# Patient Record
Sex: FEMALE | Race: WHITE | HISPANIC OR LATINO | URBAN - METROPOLITAN AREA
[De-identification: names, ages, dates, MRNs, and addresses within clinical notes are randomized per-mention and may not be internally consistent; named-entity substitution may affect disease eponyms.]

---

## 2020-03-06 ENCOUNTER — OFFICE VISIT (OUTPATIENT)
Dept: INTERNAL MEDICINE | Age: 20
End: 2020-03-06

## 2020-03-06 VITALS
HEART RATE: 80 BPM | DIASTOLIC BLOOD PRESSURE: 70 MMHG | HEIGHT: 62 IN | BODY MASS INDEX: 36.44 KG/M2 | WEIGHT: 198 LBS | OXYGEN SATURATION: 98 % | RESPIRATION RATE: 12 BRPM | SYSTOLIC BLOOD PRESSURE: 120 MMHG | TEMPERATURE: 99.4 F

## 2020-03-06 DIAGNOSIS — E55.9 VITAMIN D DEFICIENCY: ICD-10-CM

## 2020-03-06 DIAGNOSIS — Z23 NEED FOR HPV VACCINATION: ICD-10-CM

## 2020-03-06 DIAGNOSIS — Z00.00 ROUTINE GENERAL MEDICAL EXAMINATION AT HEALTH CARE FACILITY: Primary | ICD-10-CM

## 2020-03-06 PROCEDURE — 90471 IMMUNIZATION ADMIN: CPT

## 2020-03-06 PROCEDURE — 99385 PREV VISIT NEW AGE 18-39: CPT | Performed by: INTERNAL MEDICINE

## 2020-03-06 PROCEDURE — 90651 9VHPV VACCINE 2/3 DOSE IM: CPT

## 2020-03-06 SDOH — HEALTH STABILITY: MENTAL HEALTH
STRESS IS WHEN SOMEONE FEELS TENSE, NERVOUS, ANXIOUS, OR CAN'T SLEEP AT NIGHT BECAUSE THEIR MIND IS TROUBLED. HOW STRESSED ARE YOU?: TO SOME EXTENT

## 2020-03-06 SDOH — HEALTH STABILITY: MENTAL HEALTH: HOW OFTEN DO YOU HAVE A DRINK CONTAINING ALCOHOL?: MONTHLY OR LESS

## 2020-03-06 SDOH — HEALTH STABILITY: PHYSICAL HEALTH: ON AVERAGE, HOW MANY DAYS PER WEEK DO YOU ENGAGE IN MODERATE TO STRENUOUS EXERCISE (LIKE A BRISK WALK)?: 0 DAYS

## 2020-03-06 SDOH — HEALTH STABILITY: MENTAL HEALTH: HOW MANY STANDARD DRINKS CONTAINING ALCOHOL DO YOU HAVE ON A TYPICAL DAY?: 1 OR 2

## 2020-03-06 ASSESSMENT — PATIENT HEALTH QUESTIONNAIRE - PHQ9
SUM OF ALL RESPONSES TO PHQ9 QUESTIONS 1 AND 2: 0
2. FEELING DOWN, DEPRESSED OR HOPELESS: NOT AT ALL
SUM OF ALL RESPONSES TO PHQ9 QUESTIONS 1 AND 2: 0
1. LITTLE INTEREST OR PLEASURE IN DOING THINGS: NOT AT ALL

## 2020-08-11 ENCOUNTER — TELEPHONE (OUTPATIENT)
Dept: INTERNAL MEDICINE | Age: 20
End: 2020-08-11

## 2020-08-11 DIAGNOSIS — Z00.00 ROUTINE GENERAL MEDICAL EXAMINATION AT A HEALTH CARE FACILITY: Primary | ICD-10-CM

## 2020-08-11 DIAGNOSIS — E55.9 VITAMIN D DEFICIENCY: ICD-10-CM

## 2020-08-19 ENCOUNTER — OFFICE VISIT (OUTPATIENT)
Dept: INTERNAL MEDICINE | Age: 20
End: 2020-08-19

## 2020-08-19 ENCOUNTER — TELEPHONE (OUTPATIENT)
Dept: INTERNAL MEDICINE | Age: 20
End: 2020-08-19

## 2020-08-19 VITALS
RESPIRATION RATE: 14 BRPM | DIASTOLIC BLOOD PRESSURE: 62 MMHG | OXYGEN SATURATION: 99 % | HEIGHT: 62 IN | SYSTOLIC BLOOD PRESSURE: 112 MMHG | BODY MASS INDEX: 35.7 KG/M2 | HEART RATE: 75 BPM | TEMPERATURE: 98 F | WEIGHT: 194 LBS

## 2020-08-19 DIAGNOSIS — R21 RASH: Primary | ICD-10-CM

## 2020-08-19 PROCEDURE — 99214 OFFICE O/P EST MOD 30 MIN: CPT | Performed by: INTERNAL MEDICINE

## 2020-08-19 RX ORDER — TRIAMCINOLONE ACETONIDE 1 MG/G
CREAM TOPICAL 2 TIMES DAILY
Qty: 15 G | Refills: 0 | Status: SHIPPED | OUTPATIENT
Start: 2020-08-19

## 2020-08-19 SDOH — HEALTH STABILITY: MENTAL HEALTH: HOW OFTEN DO YOU HAVE A DRINK CONTAINING ALCOHOL?: MONTHLY OR LESS

## 2020-08-19 SDOH — HEALTH STABILITY: MENTAL HEALTH: HOW MANY STANDARD DRINKS CONTAINING ALCOHOL DO YOU HAVE ON A TYPICAL DAY?: 1 OR 2

## 2020-08-19 ASSESSMENT — PATIENT HEALTH QUESTIONNAIRE - PHQ9
CLINICAL INTERPRETATION OF PHQ9 SCORE: NO FURTHER SCREENING NEEDED
CLINICAL INTERPRETATION OF PHQ2 SCORE: NO FURTHER SCREENING NEEDED
SUM OF ALL RESPONSES TO PHQ9 QUESTIONS 1 AND 2: 1
1. LITTLE INTEREST OR PLEASURE IN DOING THINGS: NOT AT ALL
SUM OF ALL RESPONSES TO PHQ9 QUESTIONS 1 AND 2: 1
2. FEELING DOWN, DEPRESSED OR HOPELESS: SEVERAL DAYS

## 2020-09-01 ENCOUNTER — LAB SERVICES (OUTPATIENT)
Dept: LAB | Age: 20
End: 2020-09-01

## 2020-09-01 DIAGNOSIS — E55.9 VITAMIN D DEFICIENCY: ICD-10-CM

## 2020-09-01 DIAGNOSIS — Z00.00 ROUTINE GENERAL MEDICAL EXAMINATION AT A HEALTH CARE FACILITY: ICD-10-CM

## 2020-09-01 LAB
25(OH)D3 SERPL-MCNC: 27 NG/ML (ref 30–100)
ALBUMIN SERPL-MCNC: 4.2 G/DL (ref 3.6–5.1)
ALP SERPL-CCNC: 96 U/L (ref 45–130)
ALT SERPL W/O P-5'-P-CCNC: 21 U/L (ref 4–38)
AST SERPL-CCNC: 31 U/L (ref 14–43)
BASOPHIL %: 0.7 % (ref 0–1.2)
BASOPHIL ABSOLUTE #: 0.1 10*3/UL (ref 0–0.1)
BILIRUB SERPL-MCNC: 0.6 MG/DL (ref 0–1.3)
BUN SERPL-MCNC: 10 MG/DL (ref 7–20)
CALCIUM SERPL-MCNC: 9.3 MG/DL (ref 8.6–10.6)
CHLORIDE SERPL-SCNC: 103 MMOL/L (ref 96–107)
CHOLEST SERPL-MCNC: 181 MG/DL (ref 140–200)
CO2 SERPL-SCNC: 25 MMOL/L (ref 22–32)
CREAT SERPL-MCNC: 0.6 MG/DL (ref 0.5–1.4)
DIFFERENTIAL TYPE: ABNORMAL
EOSINOPHIL %: 1.3 % (ref 0–10)
EOSINOPHIL ABSOLUTE #: 0.1 10*3/UL (ref 0–0.5)
GFR SERPL CREATININE-BSD FRML MDRD: >60 ML/MIN/{1.73M2}
GFR SERPL CREATININE-BSD FRML MDRD: >60 ML/MIN/{1.73M2}
GLUCOSE P FAST SERPL-MCNC: 91 MG/DL (ref 60–100)
HDLC SERPL-MCNC: 51 MG/DL
HEMATOCRIT: 45.8 % (ref 34–45)
HEMOGLOBIN: 14.7 G/DL (ref 11.2–15.7)
IMMATURE GRANULOCYTE ABSOLUTE: 0.03 10*3/UL (ref 0–0.05)
IMMATURE GRANULOCYTE PERCENT: 0.4 % (ref 0–0.5)
LDLC SERPL CALC-MCNC: 107 MG/DL (ref 30–100)
LYMPH PERCENT: 35.4 % (ref 20.5–51.1)
LYMPHOCYTE ABSOLUTE #: 2.7 10*3/UL (ref 1.2–3.4)
MEAN CORPUSCULAR HGB CONCENTRATION: 32.1 % (ref 32–36)
MEAN CORPUSCULAR HGB: 28.2 PG (ref 27–34)
MEAN CORPUSCULAR VOLUME: 87.9 FL (ref 79–95)
MEAN PLATELET VOLUME: 10.9 FL (ref 8.6–12.4)
MONOCYTE ABSOLUTE #: 0.6 10*3/UL (ref 0.2–0.9)
MONOCYTE PERCENT: 7.4 % (ref 4.3–12.9)
NEUTROPHIL ABSOLUTE #: 4.2 10*3/UL (ref 1.4–6.5)
NEUTROPHIL PERCENT: 54.8 % (ref 34–73.5)
PLATELET COUNT: 441 10*3/UL (ref 150–400)
POTASSIUM SERPL-SCNC: 4.4 MMOL/L (ref 3.5–5.3)
PROT SERPL-MCNC: 6.9 G/DL (ref 6.4–8.5)
RED BLOOD CELL COUNT: 5.21 10*6/UL (ref 3.7–5.2)
RED CELL DISTRIBUTION WIDTH: 13.4 % (ref 11.3–14.8)
SODIUM SERPL-SCNC: 136 MMOL/L (ref 136–146)
TRIGL SERPL-MCNC: 113 MG/DL (ref 0–200)
TSH SERPL DL<=0.05 MIU/L-ACNC: 3.48 M[IU]/L (ref 0.3–4.82)
WHITE BLOOD CELL COUNT: 7.7 10*3/UL (ref 4–10)

## 2020-09-01 PROCEDURE — 80061 LIPID PANEL: CPT | Performed by: INTERNAL MEDICINE

## 2020-09-01 PROCEDURE — 82306 VITAMIN D 25 HYDROXY: CPT | Performed by: INTERNAL MEDICINE

## 2020-09-01 PROCEDURE — 36415 COLL VENOUS BLD VENIPUNCTURE: CPT | Performed by: INTERNAL MEDICINE

## 2020-09-01 PROCEDURE — 80050 GENERAL HEALTH PANEL: CPT | Performed by: INTERNAL MEDICINE

## 2020-09-03 DIAGNOSIS — R79.89 LOW VITAMIN D LEVEL: ICD-10-CM

## 2020-09-03 DIAGNOSIS — E55.9 VITAMIN D DEFICIENCY: Primary | ICD-10-CM

## 2020-09-03 RX ORDER — VITAMIN B COMPLEX
25 TABLET ORAL DAILY
Qty: 30 TABLET | Refills: 0 | COMMUNITY
Start: 2020-09-03

## 2020-09-28 ENCOUNTER — TELEPHONE (OUTPATIENT)
Dept: INTERNAL MEDICINE | Age: 20
End: 2020-09-28

## 2020-09-28 DIAGNOSIS — R21 RASH: Primary | ICD-10-CM

## 2020-10-01 ENCOUNTER — OFFICE VISIT (OUTPATIENT)
Dept: DERMATOLOGY | Age: 20
End: 2020-10-01
Attending: INTERNAL MEDICINE

## 2020-10-01 ENCOUNTER — LAB SERVICES (OUTPATIENT)
Dept: LAB | Age: 20
End: 2020-10-01

## 2020-10-01 DIAGNOSIS — L65.9 HAIR LOSS: ICD-10-CM

## 2020-10-01 DIAGNOSIS — L42 PITYRIASIS ROSEA: ICD-10-CM

## 2020-10-01 DIAGNOSIS — L65.9 HAIR LOSS: Primary | ICD-10-CM

## 2020-10-01 LAB — FERRITIN SERPL-MCNC: 18 NG/ML (ref 6–137)

## 2020-10-01 PROCEDURE — 99202 OFFICE O/P NEW SF 15 MIN: CPT | Performed by: NURSE PRACTITIONER

## 2020-10-01 PROCEDURE — 84630 ASSAY OF ZINC: CPT | Performed by: NURSE PRACTITIONER

## 2020-10-01 PROCEDURE — 36415 COLL VENOUS BLD VENIPUNCTURE: CPT | Performed by: NURSE PRACTITIONER

## 2020-10-01 PROCEDURE — 82728 ASSAY OF FERRITIN: CPT | Performed by: NURSE PRACTITIONER

## 2020-10-01 RX ORDER — PREDNISONE 10 MG/1
TABLET ORAL
Qty: 24 TABLET | Refills: 0 | Status: SHIPPED | OUTPATIENT
Start: 2020-10-01 | End: 2020-10-28 | Stop reason: ALTCHOICE

## 2020-10-02 LAB — ZINC RBC-MCNC: 73 MCG/DL (ref 70–120)

## 2020-10-03 DIAGNOSIS — L65.8 FEMALE PATTERN HAIR LOSS: Primary | ICD-10-CM

## 2020-10-03 RX ORDER — SPIRONOLACTONE 50 MG/1
TABLET, FILM COATED ORAL
Qty: 90 TABLET | Refills: 5 | Status: SHIPPED | OUTPATIENT
Start: 2020-10-03 | End: 2020-10-05 | Stop reason: SDUPTHER

## 2020-10-05 RX ORDER — SPIRONOLACTONE 50 MG/1
TABLET, FILM COATED ORAL
Qty: 90 TABLET | Refills: 5 | Status: SHIPPED | OUTPATIENT
Start: 2020-10-05

## 2020-10-27 ENCOUNTER — TELEPHONE (OUTPATIENT)
Dept: SCHEDULING | Age: 20
End: 2020-10-27

## 2020-10-28 ENCOUNTER — WALK IN (OUTPATIENT)
Dept: URGENT CARE | Age: 20
End: 2020-10-28

## 2020-10-28 VITALS
RESPIRATION RATE: 18 BRPM | OXYGEN SATURATION: 100 % | HEART RATE: 83 BPM | DIASTOLIC BLOOD PRESSURE: 80 MMHG | SYSTOLIC BLOOD PRESSURE: 130 MMHG | TEMPERATURE: 98.7 F

## 2020-10-28 DIAGNOSIS — R06.02 SOB (SHORTNESS OF BREATH): ICD-10-CM

## 2020-10-28 DIAGNOSIS — R11.0 NAUSEA: ICD-10-CM

## 2020-10-28 DIAGNOSIS — Z20.822 SUSPECTED COVID-19 VIRUS INFECTION: ICD-10-CM

## 2020-10-28 DIAGNOSIS — B34.9 VIRAL SYNDROME: Primary | ICD-10-CM

## 2020-10-28 LAB — SARS-COV-2 AG RESP QL IA.RAPID: NOT DETECTED

## 2020-10-28 PROCEDURE — 99203 OFFICE O/P NEW LOW 30 MIN: CPT | Performed by: FAMILY MEDICINE

## 2020-10-28 PROCEDURE — 87426 SARSCOV CORONAVIRUS AG IA: CPT | Performed by: FAMILY MEDICINE

## 2020-10-28 RX ORDER — ONDANSETRON 8 MG/1
8 TABLET, ORALLY DISINTEGRATING ORAL EVERY 8 HOURS PRN
Qty: 10 TABLET | Refills: 0 | Status: SHIPPED | OUTPATIENT
Start: 2020-10-28 | End: 2020-11-02

## 2020-10-28 RX ORDER — ONDANSETRON 8 MG/1
8 TABLET, ORALLY DISINTEGRATING ORAL ONCE
Status: COMPLETED | OUTPATIENT
Start: 2020-10-28 | End: 2020-10-28

## 2020-10-28 RX ADMIN — ONDANSETRON 8 MG: 8 TABLET, ORALLY DISINTEGRATING ORAL at 18:15

## 2021-01-18 PROBLEM — F32.A MILD DEPRESSION: Status: ACTIVE | Noted: 2021-01-18

## 2021-01-18 PROBLEM — E55.9 VITAMIN D DEFICIENCY: Status: ACTIVE | Noted: 2021-01-18

## 2021-01-18 PROBLEM — L65.9 HAIR LOSS: Status: ACTIVE | Noted: 2021-01-18

## 2021-01-18 PROBLEM — R79.89 ABNORMAL THYROID BLOOD TEST: Status: ACTIVE | Noted: 2021-01-18

## 2021-01-18 PROBLEM — E66.9 OBESITY (BMI 30-39.9): Status: ACTIVE | Noted: 2021-01-18

## 2021-01-18 PROBLEM — Z86.16 HISTORY OF 2019 NOVEL CORONAVIRUS DISEASE (COVID-19): Status: ACTIVE | Noted: 2021-01-18

## 2021-04-03 ENCOUNTER — APPOINTMENT (OUTPATIENT)
Dept: DERMATOLOGY | Age: 21
End: 2021-04-03

## 2022-01-06 PROBLEM — U07.1 COVID-19 AFFECTING PREGNANCY IN FIRST TRIMESTER: Status: ACTIVE | Noted: 2022-01-06

## 2022-01-06 PROBLEM — O98.511 COVID-19 AFFECTING PREGNANCY IN FIRST TRIMESTER: Status: ACTIVE | Noted: 2022-01-06

## 2022-01-11 PROBLEM — U07.1 COVID-19 AFFECTING PREGNANCY IN FIRST TRIMESTER: Status: ACTIVE | Noted: 2022-01-06

## 2022-01-11 PROBLEM — O98.511 COVID-19 AFFECTING PREGNANCY IN FIRST TRIMESTER: Status: ACTIVE | Noted: 2022-01-06

## 2022-02-16 ENCOUNTER — HOSPITAL ENCOUNTER (EMERGENCY)
Facility: HOSPITAL | Age: 22
Discharge: LEFT BEFORE DISCHARGE | End: 2022-02-16
Payer: MEDICAID

## 2022-02-16 VITALS
HEIGHT: 62 IN | DIASTOLIC BLOOD PRESSURE: 77 MMHG | SYSTOLIC BLOOD PRESSURE: 128 MMHG | OXYGEN SATURATION: 95 % | HEART RATE: 75 BPM | TEMPERATURE: 98 F | RESPIRATION RATE: 18 BRPM | BODY MASS INDEX: 34.96 KG/M2 | WEIGHT: 190 LBS

## 2022-02-16 RX ORDER — CEPHALEXIN 500 MG/1
500 CAPSULE ORAL 3 TIMES DAILY
COMMUNITY

## 2022-02-17 ENCOUNTER — TELEPHONE (OUTPATIENT)
Dept: PERINATAL CARE | Facility: HOSPITAL | Age: 22
End: 2022-02-17

## 2022-02-17 PROBLEM — O44.00 PLACENTA PREVIA ANTEPARTUM: Status: ACTIVE | Noted: 2022-02-17

## 2022-02-23 PROBLEM — O98.511 COVID-19 AFFECTING PREGNANCY IN FIRST TRIMESTER: Status: RESOLVED | Noted: 2022-01-06 | Resolved: 2022-02-23

## 2022-02-23 PROBLEM — U07.1 COVID-19 AFFECTING PREGNANCY IN FIRST TRIMESTER: Status: RESOLVED | Noted: 2022-01-06 | Resolved: 2022-02-23

## 2022-03-09 PROBLEM — E66.9 OBESITY, CLASS II, BMI 35-39.9: Status: ACTIVE | Noted: 2022-03-09

## 2022-03-25 PROBLEM — O99.210 OBESITY AFFECTING PREGNANCY: Status: ACTIVE | Noted: 2022-03-25

## 2022-04-07 ENCOUNTER — HOSPITAL ENCOUNTER (OUTPATIENT)
Facility: HOSPITAL | Age: 22
Discharge: HOME | End: 2022-04-07
Attending: OBSTETRICS & GYNECOLOGY | Admitting: OBSTETRICS & GYNECOLOGY
Payer: COMMERCIAL

## 2022-04-07 VITALS
WEIGHT: 203 LBS | HEIGHT: 62 IN | HEART RATE: 86 BPM | BODY MASS INDEX: 37.36 KG/M2 | TEMPERATURE: 98 F | SYSTOLIC BLOOD PRESSURE: 111 MMHG | DIASTOLIC BLOOD PRESSURE: 65 MMHG | RESPIRATION RATE: 16 BRPM

## 2022-04-07 PROCEDURE — 99202 OFFICE O/P NEW SF 15 MIN: CPT

## 2022-04-07 NOTE — PROGRESS NOTES
Options given to go home and return if symptoms return or to be evaluated in the ER. Pt desires to go home. States she has the ability to return right away if needed.

## 2022-04-07 NOTE — PROGRESS NOTES
Pt is a  at 21 wk iup who is brought to room triage-2 for c/o abd pain. Pt was seen in [facility] today and had a normal ultrasound. Pt reports being at work and have a sharp right sided pain. SHe stated the pain lasted for a couple of minutes and returned approx every 5 minutes for approx 1 hour. Pt states then they just stopped. ABD soft and nontender. Pt denies any pain at this time. Pt reports +fm and denies any LOF, VB or uc's. FHT obtained. [place] applied. POC discussed and questions answered.

## 2022-04-08 ENCOUNTER — APPOINTMENT (OUTPATIENT)
Dept: ULTRASOUND IMAGING | Facility: HOSPITAL | Age: 22
End: 2022-04-08
Attending: EMERGENCY MEDICINE
Payer: COMMERCIAL

## 2022-04-08 ENCOUNTER — HOSPITAL ENCOUNTER (EMERGENCY)
Facility: HOSPITAL | Age: 22
Discharge: HOME | End: 2022-04-08
Attending: EMERGENCY MEDICINE
Payer: COMMERCIAL

## 2022-04-08 VITALS
HEIGHT: 62 IN | BODY MASS INDEX: 37.36 KG/M2 | OXYGEN SATURATION: 97 % | TEMPERATURE: 98 F | HEART RATE: 72 BPM | RESPIRATION RATE: 16 BRPM | SYSTOLIC BLOOD PRESSURE: 109 MMHG | WEIGHT: 203 LBS | DIASTOLIC BLOOD PRESSURE: 65 MMHG

## 2022-04-08 DIAGNOSIS — N30.00 ACUTE CYSTITIS WITHOUT HEMATURIA: Primary | ICD-10-CM

## 2022-04-08 DIAGNOSIS — R10.31 ABDOMINAL PAIN, RIGHT LOWER QUADRANT: ICD-10-CM

## 2022-04-08 DIAGNOSIS — Z34.92 SECOND TRIMESTER PREGNANCY: ICD-10-CM

## 2022-04-08 LAB
ALBUMIN SERPL-MCNC: 3.2 G/DL (ref 3.4–5)
ALBUMIN/GLOB SERPL: 0.7 {RATIO} (ref 1–2)
ALP LIVER SERPL-CCNC: 92 U/L
ALT SERPL-CCNC: 52 U/L
ANION GAP SERPL CALC-SCNC: 7 MMOL/L (ref 0–18)
AST SERPL-CCNC: 35 U/L (ref 15–37)
BASOPHILS # BLD AUTO: 0.03 X10(3) UL (ref 0–0.2)
BASOPHILS NFR BLD AUTO: 0.3 %
BILIRUB SERPL-MCNC: 0.2 MG/DL (ref 0.1–2)
BILIRUB UR QL STRIP.AUTO: NEGATIVE
BUN BLD-MCNC: 9 MG/DL (ref 7–18)
CALCIUM BLD-MCNC: 9.4 MG/DL (ref 8.5–10.1)
CHLORIDE SERPL-SCNC: 104 MMOL/L (ref 98–112)
CO2 SERPL-SCNC: 24 MMOL/L (ref 21–32)
COLOR UR AUTO: YELLOW
CREAT BLD-MCNC: 0.56 MG/DL
EOSINOPHIL # BLD AUTO: 0.05 X10(3) UL (ref 0–0.7)
EOSINOPHIL NFR BLD AUTO: 0.5 %
ERYTHROCYTE [DISTWIDTH] IN BLOOD BY AUTOMATED COUNT: 13.6 %
GLOBULIN PLAS-MCNC: 4.4 G/DL (ref 2.8–4.4)
GLUCOSE BLD-MCNC: 75 MG/DL (ref 70–99)
GLUCOSE UR STRIP.AUTO-MCNC: NEGATIVE MG/DL
HCT VFR BLD AUTO: 39.9 %
HGB BLD-MCNC: 13.4 G/DL
IMM GRANULOCYTES # BLD AUTO: 0.05 X10(3) UL (ref 0–1)
IMM GRANULOCYTES NFR BLD: 0.5 %
KETONES UR STRIP.AUTO-MCNC: 20 MG/DL
LIPASE SERPL-CCNC: 162 U/L (ref 73–393)
LYMPHOCYTES # BLD AUTO: 1.78 X10(3) UL (ref 1–4)
LYMPHOCYTES NFR BLD AUTO: 19.4 %
MCH RBC QN AUTO: 29.5 PG (ref 26–34)
MCHC RBC AUTO-ENTMCNC: 33.6 G/DL (ref 31–37)
MCV RBC AUTO: 87.9 FL
MONOCYTES # BLD AUTO: 0.54 X10(3) UL (ref 0.1–1)
MONOCYTES NFR BLD AUTO: 5.9 %
NEUTROPHILS # BLD AUTO: 6.73 X10 (3) UL (ref 1.5–7.7)
NEUTROPHILS # BLD AUTO: 6.73 X10(3) UL (ref 1.5–7.7)
NEUTROPHILS NFR BLD AUTO: 73.4 %
NITRITE UR QL STRIP.AUTO: NEGATIVE
OSMOLALITY SERPL CALC.SUM OF ELEC: 277 MOSM/KG (ref 275–295)
PH UR STRIP.AUTO: 6 [PH] (ref 5–8)
PLATELET # BLD AUTO: 371 10(3)UL (ref 150–450)
POTASSIUM SERPL-SCNC: 3.6 MMOL/L (ref 3.5–5.1)
PROCALCITONIN SERPL-MCNC: <0.05 NG/ML (ref ?–0.16)
PROT SERPL-MCNC: 7.6 G/DL (ref 6.4–8.2)
PROT UR STRIP.AUTO-MCNC: 30 MG/DL
RBC # BLD AUTO: 4.54 X10(6)UL
RBC UR QL AUTO: NEGATIVE
SODIUM SERPL-SCNC: 135 MMOL/L (ref 136–145)
SP GR UR STRIP.AUTO: 1.02 (ref 1–1.03)
UROBILINOGEN UR STRIP.AUTO-MCNC: <2 MG/DL
WBC # BLD AUTO: 9.2 X10(3) UL (ref 4–11)

## 2022-04-08 PROCEDURE — 80053 COMPREHEN METABOLIC PANEL: CPT | Performed by: EMERGENCY MEDICINE

## 2022-04-08 PROCEDURE — 83690 ASSAY OF LIPASE: CPT | Performed by: EMERGENCY MEDICINE

## 2022-04-08 PROCEDURE — 87086 URINE CULTURE/COLONY COUNT: CPT | Performed by: EMERGENCY MEDICINE

## 2022-04-08 PROCEDURE — 96360 HYDRATION IV INFUSION INIT: CPT

## 2022-04-08 PROCEDURE — 76857 US EXAM PELVIC LIMITED: CPT | Performed by: EMERGENCY MEDICINE

## 2022-04-08 PROCEDURE — 84145 PROCALCITONIN (PCT): CPT | Performed by: EMERGENCY MEDICINE

## 2022-04-08 PROCEDURE — 85025 COMPLETE CBC W/AUTO DIFF WBC: CPT | Performed by: EMERGENCY MEDICINE

## 2022-04-08 PROCEDURE — 76815 OB US LIMITED FETUS(S): CPT | Performed by: EMERGENCY MEDICINE

## 2022-04-08 PROCEDURE — 81001 URINALYSIS AUTO W/SCOPE: CPT | Performed by: EMERGENCY MEDICINE

## 2022-04-08 PROCEDURE — 96361 HYDRATE IV INFUSION ADD-ON: CPT

## 2022-04-08 PROCEDURE — 99284 EMERGENCY DEPT VISIT MOD MDM: CPT

## 2022-04-08 RX ORDER — CEPHALEXIN 500 MG/1
500 CAPSULE ORAL ONCE
Status: COMPLETED | OUTPATIENT
Start: 2022-04-08 | End: 2022-04-08

## 2022-04-08 RX ORDER — CEFADROXIL 500 MG/1
500 CAPSULE ORAL 2 TIMES DAILY
Qty: 14 CAPSULE | Refills: 0 | Status: SHIPPED | OUTPATIENT
Start: 2022-04-08 | End: 2022-04-15

## 2022-04-08 NOTE — ED QUICK NOTES
Spoke with Guadalupe Draper RN OB charge, per [facility] charge Dr. Kim Valdovinos would like pt to be seen in ER first.

## 2022-04-08 NOTE — ED INITIAL ASSESSMENT (HPI)
Pt is 21 weeks pregnant, was seen by her OB Dr. Sydney Peoples this morning and was sent to the ER. Pt reports she had RLQ sharp intermittent abdominal pain yesterday which continues today. Denies any vaginal bleeding or urinary symptoms. States at most the pain will last 2 minutes then go away.

## 2022-08-11 NOTE — H&P
[address] and Delivery Prenatal History and Physical Interval Addendum  Please see full Prenatal Record for this pregnancy      SUBJECTIVE:    Interval History:      This is a pregnancy at 39 weeks admitted for NR NST in office today, done for obesity  No c/o    OBJECTIVE:    The patient is without c/o    Fetal Surveillance:  Fetal heart variability: moderate    Cervix:  Dilation:2cm  Effacement:70  Station:-2  AROM clear    ASSESSMENT/PLAN:    39 weeks NR NST in office  Gbs status: neg  Problems pertinent to admission: obesity  Pit IOL   FWB reassuring at present

## 2022-08-11 NOTE — PLAN OF CARE
Problem: BIRTH - VAGINAL/ SECTION  Goal: Fetal and maternal status remain reassuring during the birth process  Description: INTERVENTIONS:  - Monitor vital signs  - Monitor fetal heart rate  - Monitor uterine activity  - Monitor labor progression (vaginal delivery)  - DVT prophylaxis (C/S delivery)  - Surgical antibiotic prophylaxis (C/S delivery)  Outcome: Progressing     Problem: PAIN - ADULT  Goal: Verbalizes/displays adequate comfort level or patient's stated pain goal  Description: INTERVENTIONS:  - Encourage pt to monitor pain and request assistance  - Assess pain using appropriate pain scale  - Administer analgesics based on type and severity of pain and evaluate response  - Implement non-pharmacological measures as appropriate and evaluate response  - Consider cultural and social influences on pain and pain management  - Manage/alleviate anxiety  - Utilize distraction and/or relaxation techniques  - Monitor for opioid side effects  - Notify MD/LIP if interventions unsuccessful or patient reports new pain  - Anticipate increased pain with activity and pre-medicate as appropriate  Outcome: Progressing     Problem: ANXIETY  Goal: Will report anxiety at manageable levels  Description: INTERVENTIONS:  - Administer medication as ordered  - Teach and rehearse alternative coping skills  - Provide emotional support with 1:1 interaction with staff  Outcome: Progressing     Problem: Patient/Family Goals  Goal: Patient/Family Long Term Goal  Description: Patient's Long Term Goal: uncomplicated vaginal delivery    Interventions:  - See additional Care Plan goals for specific interventions  Outcome: Progressing  Goal: Patient/Family Short Term Goal  Description: Patient's Short Term Goal: effective pain management    Interventions:   - See additional Care Plan goals for specific interventions  Outcome: Progressing

## 2022-08-11 NOTE — ANESTHESIA PROCEDURE NOTES
Labor Analgesia  Performed by: Collins Hidalgo MD  Authorized by: Collins Hidalgo MD       General Information and Staff    Start Time:  8/11/2022 4:20 PM  End Time:  8/11/2022 4:28 PM  Anesthesiologist:  Collins Hidalgo MD  Performed by:   Anesthesiologist  Patient Location:  OB  Site Identification: surface landmarks    Reason for Block: labor epidural    Preanesthetic Checklist: patient identified, IV checked, risks and benefits discussed, monitors and equipment checked, pre-op evaluation, timeout performed, IV bolus, anesthesia consent and sterile technique used      Procedure Details    Patient Position:  Sitting  Prep: ChloraPrep    Monitoring:  Heart rate and continuous pulse ox  Approach:  Midline    Epidural Needle    Injection Technique:  SUN saline  Needle Type:  Tuohy  Needle Gauge:  17 G  Needle Length:  3.375 in  Needle Insertion Depth:  5  Location:  L3-4    Spinal Needle      Catheter    Catheter Type:  End hole  Catheter Size:  19 G  Catheter at Skin Depth:  11  Test Dose:  Negative    Assessment  Sensory Level:  T10    Additional Comments     Test Dose Given at 4:28 pm  Fentanyl 2 mc/ml + Ropivicaine 0.15% epidural infusion 12cc/hr  Fentanyl 50 mcg/mL 100 mcg

## 2022-08-11 NOTE — PROGRESS NOTES
Pt is a 23year old female admitted to 108/108-A. Pt is  39w0d intra-uterine pregnancy. Patient presents with:  Scheduled Induction: Pt sent from OB office for IOL d/t NR NST     History obtained, oriented to room, staff, and plan of care.

## 2022-08-12 NOTE — PROGRESS NOTES
Patient up to bathroom with assist x 2. Voided 50, maxine care done, pt tolerated well. Patient transferred to mother/baby room 1110 per wheelchair in stable condition with baby and personal belongings. Accompanied by significant other and staff. Report given to mother/baby RN.

## 2022-08-12 NOTE — PLAN OF CARE
Problem: BIRTH - VAGINAL/ SECTION  Goal: Fetal and maternal status remain reassuring during the birth process  Description: INTERVENTIONS:  - Monitor vital signs  - Monitor fetal heart rate  - Monitor uterine activity  - Monitor labor progression (vaginal delivery)  - DVT prophylaxis (C/S delivery)  - Surgical antibiotic prophylaxis (C/S delivery)  2022 by Ani Monroe RN  Outcome: Completed  2022 by Ani Monroe RN  Outcome: Progressing     Problem: PAIN - ADULT  Goal: Verbalizes/displays adequate comfort level or patient's stated pain goal  Description: INTERVENTIONS:  - Encourage pt to monitor pain and request assistance  - Assess pain using appropriate pain scale  - Administer analgesics based on type and severity of pain and evaluate response  - Implement non-pharmacological measures as appropriate and evaluate response  - Consider cultural and social influences on pain and pain management  - Manage/alleviate anxiety  - Utilize distraction and/or relaxation techniques  - Monitor for opioid side effects  - Notify MD/LIP if interventions unsuccessful or patient reports new pain  - Anticipate increased pain with activity and pre-medicate as appropriate  2022 by Ani Monroe RN  Outcome: Completed  2022 by Ani Monroe RN  Outcome: Progressing     Problem: ANXIETY  Goal: Will report anxiety at manageable levels  Description: INTERVENTIONS:  - Administer medication as ordered  - Teach and rehearse alternative coping skills  - Provide emotional support with 1:1 interaction with staff  2022 by Ani Monroe RN  Outcome: Completed  2022 by Ani Monroe RN  Outcome: Progressing     Problem: Patient/Family Goals  Goal: Patient/Family Long Term Goal  Description: Patient's Long Term Goal: uncomplicated vaginal delivery    Interventions:  - See additional Care Plan goals for specific interventions  2022 by [place], MAX Romero  Outcome: Completed  8/11/2022 1938 by Linda Klein RN  Outcome: Progressing  Goal: Patient/Family Short Term Goal  Description: Patient's Short Term Goal: effective pain management    Interventions:   - See additional Care Plan goals for specific interventions  8/11/2022 2302 by Linda Klein RN  Outcome: Completed  8/11/2022 1938 by Linda Klein RN  Outcome: Progressing

## 2022-08-12 NOTE — PROGRESS NOTES
NURSING ADMISSION NOTE      Patient admitted via   ID bands verified with labor RN. Oriented to room, care of plan reviewed and patient education discussed. Safety precautions initiated. Bed in low position. Call light in reach.

## 2022-08-12 NOTE — L&D DELIVERY NOTE
Neil Montiel [[id number]]    Labor Events     labor?: No   steroids?: None  Antibiotics received during labor?: No  Antibiotics (enter # doses in comment): none  Rupture date/time: 2022 1313     Rupture type: AROM  Fluid color: Clear, Pink  Induction: None  Augmentation: Oxytocin     Labor Event Times    Labor onset date/time: 2022 1715  Dilation complete date/time: 2022 2156     [place] Presentation    Presentation: Vertex  Position: Left Occiput Anterior     Operative Delivery    Operative Vaginal Delivery: No            Shoulder Dystocia    Shoulder Dystocia: No     Anesthesia    Method: Epidural           Delivery    Delivery date/time:  22 22:56:10   Delivery type: Normal spontaneous vaginal delivery    Details:     Delivery location: delivery room     Delivery Providers    Delivering Clinician: Rachel Chavez MD   Delivery personnel:  Provider Role   Miller Paget, RN Baby Nurse   Wily Adjutant, RN Delivery Nurse         Cord    Vessels: 3 Vessels  Complications: Nuchal  # of loops: 1  Timed cord clamping: Yes  Time in sec: 64  Cord blood disposition: to lab  Gases sent?: No     Resuscitation    Method: None      Measurements    Weight: 3440 g 7 lb 9.3 oz Length: 48.3 cm   Head circum.: 34.5 cm Chest circum.: 32.5 cm      Abdominal circum.: 33 cm       Placenta    Date/time: 2022 2258  Removal: Spontaneous  Appearance: Intact  Disposition: held for future pathology     Apgars    Living status: Living   Apgar Scoring Key:    0 1 2    Skin color Blue or pale Acrocyanotic Completely pink    Heart rate Absent <100 bpm >100 bpm    Reflex irritability No response Grimace Cry or active withdrawal    Muscle tone Limp Some flexion Active motion    Respiratory effort Absent Weak cry; hypoventilation Good, crying              1 Minute:  5 Minute:  10 Minute:  15 Minute:  20 Minute:    Skin color: 0  1       Heart rate: 2  2       Reflex irritablity: 2 2       Muscle tone: 2  2       Respiratory effort: 2  2       Total: 8  9          Apgars assigned by: Dorothea Perrin RN  [place] disposition: with mother     Skin to Skin    Skin to skin initiated date/time: 2022  Skin to skin with: Mother     Vaginal Count    Initial count RN: Ramona Forbes RN  Initial count Tech: Hortencia Done   Sponges   Sharps    Initial counts 11   0    Final counts 11   2    Final count RN: Ramona Forbes RN  Final count MD: Ela Gloria MD     Delivery (Maternal)    Episiotomy: None  Perineal lacerations: 2nd Repaired?: Yes   Vaginal laceration?: No    Cervical laceration?: No    Clitoral laceration?: No              OB/GYN: Vaginal Delivery Note       History:   OB History     T0    L0    SAB0  IAB0  Ectopic0  Multiple0  Live Births0   Pre-Delivery Diagnosis:  44 w  Post-Delivery Diagnosis: same viable male    Procedure:  with repair 2' perineal and 1' periurethral laceration  Surgeon:Shawn  Anesthesia: epidural  Episiotomy or Laceration: none? Placenta and Cord:   Mechanism: spontaneous  Description: nuchal x 2 , intact  Estimated Blood Loss: 300  Condition: stable    Patient brought in for IOL at 39 weeks for non reactive NST in office. She had amniotomy, Pitocin, epidural and progressed to complete. She pushed for 40 m    Comments: The patient was pushing and making adequate descent with the head  at the introitus. She was prepped and drapped. The head was delivered in the ANGEL LUIS position. There was  a nuchal cord times 1, reduced easily. . The infant was bulbed suctioned. There was delivery of the anterior shoulder and the remainder of the body without complication. The infant was placed on the mothers chest. The cord was clamped and cut by the FOB after 30 + second delay. There was spontaneous delivery of an intact 3V cord placenta. . There was good hemostasis with Pitocin added to the IV and uterine massage.  Visual inspection revealed 2' perineal and 1; periurethral  repaired with  2.0 / 3.0 vicryl. There was no rectal involvement.  The patient and infant were stable in the [facility] Aleshia Kelly MD  8/11/2022  11:19 PM

## 2022-08-13 NOTE — PROGRESS NOTES
Discharged from mother baby in stable condition. Discharge instructions reviewed with mother and partner. All questions answered. Mother feels confident in care of self and infant.

## 2022-08-15 NOTE — PROGRESS NOTES
Reviewed self and infant care w / mom, she verbalizes understanding of instructions reviewed. Encourage to follow up w/ MDs as directed and w/ questions/concerns. Mainly pumping, doing well, Lives w husb for help and support, denies ppd or bb but care reviewed and enc to join ct.

## 2023-04-28 NOTE — DISCHARGE INSTRUCTIONS
Take an over-the-counter probiotic daily while on the antibiotics. Replace your toothbrush. Interventions for sore throat: Warm salt water gargles 3 times daily. Mix a teaspoon of honey in the liquid such as juice or tea. Cough or throat drops. Drink plenty of fluids, mainly consisting of water. Please read the attached discharge instructions. Go to your nearest ER for new or worsening symptoms.

## 2024-03-30 NOTE — ED PROVIDER NOTES
Patient Seen in: [facility]      History     Chief Complaint   Patient presents with    Covid    Sore Throat     Stated Complaint: sore throat    Subjective:   24-year-old female presents today with complaints of worsening sore throat.  Recently was diagnosed with COVID-19.  Is currently on Paxlovid.  Patient is 36 weeks pregnant.  Denies any difficulty swallowing controlling secretions.  No stridor shortness of breath.  Alert oriented x 3.  No other symptoms or concerns.  The patient's medication list, past medical history and social history elements as listed in today's nurse's notes were reviewed and agreed (except as otherwise stated in the HPI).  The patient's family history reviewed and determined to be noncontributory to the presenting problem            Objective:   Past Medical History:   Diagnosis Date    Hypothyroidism     no current meds    Obesity, Class II, BMI 35-39.9 3/9/2022    Thyroid disease     Vitamin D deficiency               History reviewed. No pertinent surgical history.             Social History     Socioeconomic History    Marital status:    Tobacco Use    Smoking status: Never    Smokeless tobacco: Never   Vaping Use    Vaping Use: Never used   Substance and Sexual Activity    Alcohol use: Not Currently     Comment: soc    Drug use: Never    Sexual activity: Yes     Partners: Male              Review of Systems    Positive for stated complaint: sore throat  Other systems are as noted in HPI.  Constitutional and vital signs reviewed.      All other systems reviewed and negative except as noted above.    Physical Exam     ED Triage Vitals [03/30/24 1513]   /86   Pulse 107   Resp 20   Temp 97.8 °F (36.6 °C)   Temp src Temporal   SpO2 98 %   O2 Device None (Room air)       Current:/86   Pulse 107   Temp 97.8 °F (36.6 °C) (Temporal)   Resp 20   Ht 157.5 cm (5' 2\")   Wt 98.9 kg   SpO2 98%   BMI 39.87 kg/m²         Physical Exam  Vitals and nursing note  reviewed.   Constitutional:       Appearance: She is well-developed.   HENT:      Head: Normocephalic.      Right Ear: Tympanic membrane normal.      Left Ear: Tympanic membrane normal.      Mouth/Throat:      Mouth: Mucous membranes are moist.      Pharynx: Posterior oropharyngeal erythema present.      Tonsils: No tonsillar exudate.   Cardiovascular:      Rate and Rhythm: Normal rate.   Pulmonary:      Effort: Pulmonary effort is normal.      Breath sounds: Normal breath sounds.   Musculoskeletal:      Cervical back: Normal range of motion and neck supple.   Skin:     General: Skin is warm and dry.   Neurological:      Mental Status: She is alert and oriented to person, place, and time.               ED Course     Labs Reviewed   POCT RAPID STREP - Normal                      MDM     Please note that this report has been produced using speech recognition software and may contain errors related to that system including, but not limited to, errors in grammar, punctuation, and spelling, as well as words and phrases that possibly may have been recognized inappropriately.  If there are any questions or concerns, contact the dictating provider for clarification.        Note to patient: The 21st Century Cures Act makes medical notes like these available to patients in the interest of transparency. However, this is a medical document intended as peer to peer communication. It is written in medical language and may contain abbreviations or verbiage that are unfamiliar. It may appear blunt or direct. Medical documents are intended to carry relevant information, facts as evident, and the clinical opinion of the practitioner.                                   Medical Decision Making  Differential diagnosis includes but is not limited to: Viral pharyngitis, strep throat, peritonsillar abscess, COVID-19      Presented today with presents positive testing of COVID-19.  Started developing worsening sore throat concerned about  possible strep.  Rapid strep was done and negative.  Explained patient's symptoms are due to COVID-19.  Push fluids rest take Tylenol for pain.  May also use throat lozenges or sprays.  To gargle salt water.  Encouraged to follow with her primary care physician 1 week if symptoms do not prove.  Patient verbalized understanding and agreed to plan of care.    Amount and/or Complexity of Data Reviewed  Labs: ordered.     Details: Rapid strep-negative    Risk  OTC drugs.        Disposition and Plan     Clinical Impression:  1. Acute viral pharyngitis    2. COVID-19         Disposition:  Discharge  3/30/2024  3:30 pm    Follow-up:  Lor Diaz  [address]  [address]  [facility] [address] [phone]    In 1 week  As needed          Medications Prescribed:  Current Discharge Medication List

## 2024-03-30 NOTE — ED INITIAL ASSESSMENT (HPI)
Patient tested positive for covid yesterday. She has a very sore throat and thinks she sees new white spots on her tonsils. She is 32 weeks pregnant.

## 2024-04-22 NOTE — NST
Nonstress Test   Patient: Génesis Vergara    Gestation: 35w3d    NST:       Variability: Moderate           Accelerations: Yes           Decelerations: None            Baseline: 130 BPM           Uterine Irritability: Yes           Contractions: Irregular                                        Acoustic Stimulator: No           Nonstress Test Interpretation: Reactive                      FHR Category: Category I          Additional Comments

## 2024-04-22 NOTE — PROGRESS NOTES
Pt ROM+ negative, no pooling, Nitrazine negative, no ferning. Pt to be discharged home and will follow up in office as scheduled 5/2. Strict return precautions reviewed, copy of AVS provided and pt verbalizes understanding

## 2024-04-22 NOTE — PROGRESS NOTES
Pt is a 24 year old female admitted to TR/TRG1-A.     Chief Complaint   Patient presents with    R/o  Labor     Pt noticed cramping throughout the day  and noticed more wetness 0300 and 0430       Pt is  35w3d intra-uterine pregnancy.  History obtained, consents signed. Oriented to room, staff, and plan of care.

## 2024-05-11 NOTE — ANESTHESIA PREPROCEDURE EVALUATION
PRE-OP EVALUATION    Patient Name: Génesis Vergara    Admit Diagnosis: Pregnancy (HCC) [Z34.90]    Pre-op Diagnosis: * No surgery found *        Anesthesia Procedure: LABOR ANALGESIA    * Surgery not found *    Pre-op vitals reviewed.        Body mass index is 40.6 kg/m².    Current medications reviewed.  Hospital Medications:   oxyTOCIN in sodium chloride 0.9% (Pitocin) 30 Units/500mL infusion premix        fentaNYL-bupivacaine in sodium chloride 0.9% 2 mcg/mL-0.125% EPIDURAL infusion premix        fentaNYL (Sublimaze) 50 mcg/mL injection        lactated ringers infusion   Intravenous Continuous    dextrose in lactated ringers 5% infusion   Intravenous PRN    lactated ringers IV bolus 500 mL  500 mL Intravenous PRN    acetaminophen (Tylenol Extra Strength) tab 500 mg  500 mg Oral Q6H PRN    acetaminophen (Tylenol Extra Strength) tab 1,000 mg  1,000 mg Oral Q6H PRN    ibuprofen (Motrin) tab 600 mg  600 mg Oral Once PRN    ondansetron (Zofran) 4 MG/2ML injection 4 mg  4 mg Intravenous Q6H PRN    oxyTOCIN in sodium chloride 0.9% (Pitocin) 30 Units/500mL infusion premix  62.5-900 faizan-units/min Intravenous Continuous    terbutaline (Brethine) 1 MG/ML injection 0.25 mg  0.25 mg Subcutaneous PRN    sodium citrate-citric acid (Bicitra) 500-334 MG/5ML oral solution 30 mL  30 mL Oral PRN    lactated ringers IV bolus 1,000 mL  1,000 mL Intravenous Once    fentaNYL-bupivacaine 2 mcg/mL-0.125% in sodium chloride 0.9% 100 mL EPIDURAL infusion premix  12 mL/hr Epidural Continuous    fentaNYL (Sublimaze) 50 mcg/mL injection 100 mcg  100 mcg Epidural Once    EPHEDrine (PF) 25 MG/5 ML injection 5 mg  5 mg Intravenous PRN    nalbuphine (Nubain) 10 mg/mL injection 2.5 mg  2.5 mg Intravenous Q15 Min PRN       Outpatient Medications:     Medications Prior to Admission   Medication Sig Dispense Refill Last Dose    Prenatal Multivit-Min-Fe-FA (PRENATAL OR) Take by mouth.   5/11/2024    Nirmatrelvir-Ritonavir (PAXLOVID, 150/100,  OR) Take by mouth.          Allergies: Patient has no known allergies.      Anesthesia Evaluation    Patient summary reviewed.    Anesthetic Complications  (-) history of anesthetic complications         GI/Hepatic/Renal    Negative GI/hepatic/renal ROS.                             Cardiovascular    Negative cardiovascular ROS.  ECG reviewed.  Exercise tolerance: good     MET: >4                                           Endo/Other    Negative endo/other ROS.                              Pulmonary    Negative pulmonary ROS.                       Neuro/Psych    Negative neuro/psych ROS.                                  History reviewed. No pertinent surgical history.  Social History     Socioeconomic History    Marital status:    Tobacco Use    Smoking status: Never    Smokeless tobacco: Never   Vaping Use    Vaping status: Never Used   Substance and Sexual Activity    Alcohol use: Not Currently     Comment: soc    Drug use: Never    Sexual activity: Yes     Partners: Male     History   Drug Use Unknown     Available pre-op labs reviewed.  Lab Results   Component Value Date    WBC 12.6 (H) 05/11/2024    RBC 4.16 05/11/2024    HGB 10.8 (L) 05/11/2024    HCT 33.0 (L) 05/11/2024    MCV 79.3 (L) 05/11/2024    MCH 26.0 05/11/2024    MCHC 32.7 05/11/2024    RDW 14.7 05/11/2024    .0 05/11/2024               Airway      Mallampati: II  Mouth opening: >3 FB  TM distance: 4 - 6 cm  Neck ROM: full Cardiovascular    Cardiovascular exam normal.  Rhythm: regular  Rate: normal  (-) murmur   Dental             Pulmonary    Pulmonary exam normal.  Breath sounds clear to auscultation bilaterally.               Other findings              ASA: 2   Plan: epidural       Post-procedure pain management plan discussed with surgeon and patient.      Plan/risks discussed with: patient  Use of blood product(s) discussed with: patient    Consented to blood products.          Present on Admission:  **None**

## 2024-05-11 NOTE — ANESTHESIA PROCEDURE NOTES
Labor Analgesia    Date/Time: 5/11/2024 6:06 AM    Performed by: Jayy Shaver DO  Authorized by: Jayy Shaver DO      General Information and Staff    Start Time:  5/11/2024 6:06 AM  End Time:  5/11/2024 6:14 AM  Anesthesiologist:  Jayy Shaver DO  Performed by:  Anesthesiologist  Patient Location:  OB  Site Identification: surface landmarks    Reason for Block: labor epidural    Preanesthetic Checklist: patient identified, IV checked, risks and benefits discussed, monitors and equipment checked, pre-op evaluation, timeout performed, IV bolus, anesthesia consent and sterile technique used      Procedure Details    Patient Position:  Sitting  Prep: ChloraPrep    Monitoring:  Heart rate and continuous pulse ox  Approach:  Midline    Epidural Needle    Injection Technique:   Needle Type:  Tuohy  Needle Gauge:  17 G  Needle Length:  3.375 in  Needle Insertion Depth:  5  Location:  L3-4    Spinal Needle      Catheter    Catheter Type:  End hole  Catheter Size:  19 G  Catheter at Skin Depth:  10  Test Dose:  Negative    Assessment      Additional Comments       Patient in sitting position, assisted by RN; hands sterilized with alcohol gel, sterile gloves, mask, and hat worn, sterile prep (chloraprep) and drape applied, +SUN to saline @5cm, epidural catheter threaded easily, and secured to patients' back, patient denied any paraesthesias, no meseret blood or CSF aspirated; negative test dose; epidural dosed slowly with diluted local anesthetic without significant hypotension; no immediate complications observed. Patient tolerated procedure well.

## 2024-05-11 NOTE — PROGRESS NOTES
Pt states she is feeling dizzy. Iv fluid continuing to bolus. Rn drawing ephedrine up. Pt began to pass out. Pt aroused with sternal rub, called for assistance, ephedrine being pushed as anesthesia and additional staff came to bedside. Pt placed further in right lateral, o2 10 L applied. Pt remained conscious.

## 2024-05-11 NOTE — H&P
[facility]  Labor and Delivery Prenatal History and Physical Interval Addendum  Please see full Prenatal Record for this pregnancy      SUBJECTIVE:    Interval History:     This is a 24 year old  at 38w1d admitted for painful contractions in labor    Past Medical History:    Hypothyroidism    no current meds    Obesity, Class II, BMI 35-39.9    Thyroid disease    Vitamin D deficiency     History reviewed. No pertinent surgical history.   Allergies:  No Known Allergies      OBJECTIVE:        Fetal Surveillance:  Fetal heart variability: moderate  Fetal Heart Rate decelerations: variable  Fetal Heart Rate accelerations: yes  Baseline FHR: 150 per minute  Uterine contractions: regular, every 3-4 minutes      Cervix:  Dilation: 6  Effacement:100%  Station:0  AROM clear    Labs:  Recent Labs   Lab 24  0545   RBC 4.16   HGB 10.8*   HCT 33.0*   MCV 79.3*   MCH 26.0   MCHC 32.7   RDW 14.7   NEPRELIM 9.51*   WBC 12.6*   .0         ASSESSMENT/PLAN:    24 year old  at 38w1d   Reason for admission: term pregnancy in labor  Fetal well being: reassuring due to mod juju  GBS- neg  Expect

## 2024-05-11 NOTE — PROGRESS NOTES
Pt is a  at 38.1 weeks admitted directly to room 114 c/o of intense contractions. Pt appears to be bearing down. Pt helped out of wheelchair and into bed. Pt would like pain medication. Efm tested and applied.  at bedside.

## 2024-05-11 NOTE — L&D DELIVERY NOTE
Syd Vergara [[id number]]      Labor Events     labor?: No   steroids?: None  Antibiotics received during labor?: No  Rupture date/time: 2024     Rupture type: AROM  Fluid color: Clear  Labor type: Spontaneous Onset of Labor  Augmentation: AROM  Indications for augmentation: Ineffective Contraction Pattern  Intrapartum & labor complications: None       Labor Event Times    Labor onset date/time: 2024  Dilation complete date/time: 2024  Start pushing date/time: 2024        Presentation    Presentation: Vertex  Position: Occiput Posterior       Operative Delivery    Operative Vaginal Delivery: No                Shoulder Dystocia    Shoulder Dystocia: No       Anesthesia    Method: Epidural              [place] Delivery      Head delivery date/time: 2024 08:36:26   Delivery date/time:  24 08:36:29   Delivery type: Normal spontaneous vaginal delivery    Details:     Delivery location: delivery room       Delivery Providers    Delivering Clinician: Chayo Shah MD   Delivery personnel:  Provider Role   Ilana Huerta RN Baby Nurse   Esa Lange RN Delivery Nurse             Cord    Timed cord clamping: Yes  Time in sec: 30  Cord blood disposition: to lab  Gases sent?: No       Resuscitation    Method: None        Measurements      Weight: 3170 g 6 lb 15.8 oz Length: 48.3 cm     Head circum.: 34 cm Chest circum.: 32 cm      Abdominal circum.: 29 cm           Placenta    Date/time: 2024  Removal: Spontaneous  Appearance: Intact  Disposition: held for future pathology       Apgars    Living status: Living   Apgar Scoring Key:    0 1 2    Skin color Blue or pale Acrocyanotic Completely pink    Heart rate Absent <100 bpm >100 bpm    Reflex irritability No response Grimace Cry or active withdrawal    Muscle tone Limp Some flexion Active motion    Respiratory effort Absent Weak cry; hypoventilation Good, crying               1 Minute:  5 Minute:  10 Minute:  15 Minute:  20 Minute:      Skin color:        Heart rate:        Reflex irritablity:        Muscle tone:        Respiratory effort:        Total:           Apgars assigned by: SEGUNDO GILLIS RN       Skin to Skin    No data filed       Vaginal Count       Sponges   Sharps    Initial counts 11   0    Final counts 11   1    Final count RN: Esa Lange RN  Final count MD: Chayo Shah MD       Lacerations    Episiotomy: None  Perineal lacerations: 2nd Repaired?: Yes                                                                           Vaginal Delivery Note          Génesis Vergara Patient Status:  Inpatient    2000 MRN [id number]   Location [facility] LABOR & DELIVERY Attending Jacques Marcelo MD   Hosp Day # 0 PCP ROXI SQUIRES       Delivery: Normal spontaneous vaginal delivery  Surgeon: Chayo Shah MD  Anesthesia: Epidural  QBL: 111 cc  Infant: male  Apgars: 9/9  Weight: 3170g    Brief history and labor course:  Ms. Génesis Vergara is a 24 year old  at 38wk1d. She presented to labor and delivery for labor. Her pregnancy was uncomplicated. On exam in triage she was noted to be 3/80/-1. She was admitted for labor. She had AROM and progressed to complete dilation.     After pushing for 11 minutes, at 0836 patient delivered a viable male , wt pending, apgars of 9 (1 min) and 9 (5 min). The fetal vertex delivered spontaneously. Baby was checked for nuchal. Body cord was identified on delivery. The anterior shoulder delivered atraumatically with maternal expulsive forces and the assistance of gentle downward traction. The posterior shoulder delivered with maternal expulsive forces and the assistance of upward traction. The remainder of the fetus delivered spontaneously.     Upon delivery, the infant was placed on the mothers abdomen and the cord was clamped and cut. Delayed cord clamping was performed.  The infant was noted  to cry spontaneously and was moving all extremities appropriately. There was no evidence for injury. Awaiting nurse resuscitators evaluated the . In the immediate post-partum, 30 units of IV pitocin was administered, and the uterus was noted to contract down well with massage and pitocin. The placenta delivered spontaneously at 0838 and was noted to have a centrally inserted 3 vessel cord.     The vagina, cervix, perineum, and rectum were inspected and there was noted to be a 2nd degree laceration. 3-0 vicryl was used to repair the laceration in the standard fashion. Good hemostasis was acheived.    At the conclusion of the procedure, all needle, sponge, and instrument counts were noted to be correct. Patient tolerated the procedure well and was allowed to recover in labor and delivery room with family and  before being transferred to the post-partum floor.     Complications:  None    Chayo Shah MD

## 2024-05-11 NOTE — PROGRESS NOTES
SVE: 10/100/+1, patient with intermittent vaginal pressure  FHT: recurrent variable decelerations with quick recovery to baseline  [place]: every 2-3 minutes apart  Will plan to start pushing shortly        Chayo Shah MD

## 2024-05-11 NOTE — PROGRESS NOTES
Patient up to bathroom with assist x 2.  Voided at this time. Patient transferred to mother/baby room 2206 per wheelchair in stable condition with baby and personal belongings.  Accompanied by significant other and staff.  Report given to mother/baby RNRaquel.

## 2024-05-11 NOTE — PROGRESS NOTES
Received in MBU - stable condition.  Oriented to room, side rails up X2, bed in low position, call light within reach.  Plan of care reviewed with pt.  Pt agrees to the plan of care.

## 2024-05-11 NOTE — PROGRESS NOTES
Pt is a 24 year old female admitted to 114/114-A.     Chief Complaint   Patient presents with    R/o Labor      Pt is  38w1d intra-uterine pregnancy.  History obtained, consents signed. Oriented to room, staff, and plan of care.

## 2024-05-12 NOTE — PROGRESS NOTES
Discharge home with infant in stable condition     head inj s/p slip and fall in shower. pt c/o knee, head, and chest pain. No LOC

## 2024-05-12 NOTE — PROGRESS NOTES
Postpartum Progress Note    SUBJECTIVE:  Postpartum day #1 s/p     No overnight events.  Patient doing well without complaints.  Ambulating, tolerating PO, voiding, pain well controlled.      OBJECTIVE:    Vital signs in last 24 hours:  Temp:  [97.6 °F (36.4 °C)-98.4 °F (36.9 °C)] 97.7 °F (36.5 °C)  Pulse:  [] 74  Resp:  [16] 16  BP: (102-128)/(62-78) 102/66  SpO2:  [98 %-99 %] 98 %    Gen: appears well, nad  Abdomin: nontender, fundus firm below umbilicus  Lochia:    Minimal to moderate  Extremity: nontender    ASSESSMENT/PLAN:  Postpartum Day #1 s/p   Recovering well  Continue present management  Venofer today for anemia  Discharge home today - instructions reviewed, follow up in 6 weeks for postpartum visit    Chayo Shah MD  2024  9:30 AM

## 2024-05-12 NOTE — PROGRESS NOTES
Labor Analgesia Follow Up Note    Patient underwent epidural anesthesia for labor analgesia,    Placenta Date/Time: 5/11/2024  8:38 AM     Delivery Date/Time:: 5/11/2024   8:36 AM     /66 (BP Location: Right arm)   Pulse 74   Temp 97.7 °F (36.5 °C) (Oral)   Resp 16   Ht 1.575 m (5' 2\")   Wt 100.7 kg (222 lb)   SpO2 98%   Breastfeeding Yes   BMI 40.60 kg/m²     Assessment:  Patient seen and no apparent anesthesia related complications.    Thank you for asking us to participate in the care of your patient.

## 2024-05-18 NOTE — PROGRESS NOTES
05/18/24 1109   Cradle Call Follow up   Cradle call follow up date 05/18/24   Follow up needed Completed   Hypertension or Preeclampsia during pregnancy or delivery No     Outgoing Cradle Call completed:    Mom reports that she and infant are doing well.  Baby has had a pediatrician F/U visit.   Reminded pt to schedule a postpartum follow up visit.   No complaints of PPD.   Reviewed basic self and infant care.   Encouraged to follow up w/ MD's w/ further question/concerns.

## 2024-12-15 NOTE — ED PROVIDER NOTES
Patient Seen in: [facility] Emergency Department      History     Chief Complaint   Patient presents with    Eval-G     Stated Complaint: Vaginal bleeding    Subjective:   HPI      24-year-old female  approximately 8 weeks pregnant presents with heavy vaginal bleeding that started at 3 AM.  She states she has been passing clots and has gone through 3 night pads.  Patient states that she saw her OB about 2 weeks ago and she was about 6 weeks pregnant at that time but they did not see a heartbeat.  Patient states she was also follow-up this week for repeat ultrasound but was told she may be having a miscarriage.  She reports some lower abdominal cramping.  Denies feeling dizzy or lightheaded.    Objective:     Past Medical History:    Hypothyroidism    no current meds    Obesity, Class II, BMI 35-39.9    Thyroid disease    Vitamin D deficiency              History reviewed. No pertinent surgical history.             Social History     Socioeconomic History    Marital status:    Tobacco Use    Smoking status: Never    Smokeless tobacco: Never   Vaping Use    Vaping status: Never Used   Substance and Sexual Activity    Alcohol use: Not Currently     Comment: soc    Drug use: Never    Sexual activity: Yes     Partners: Male     Social Drivers of Health     Transportation Needs: Unknown (2024)    Transportation Needs     Car Seat: Yes    Received [facility]    Housing Stability                  Physical Exam     ED Triage Vitals [12/15/24 0514]   /86   Pulse 89   Resp 18   Temp 98.9 °F (37.2 °C)   Temp src Temporal   SpO2 99 %   O2 Device None (Room air)       Current Vitals:   Vital Signs  BP: 124/72  Pulse: 74  Resp: 18  Temp: 98.9 °F (37.2 °C)  Temp src: Temporal  MAP (mmHg): 86    Oxygen Therapy  SpO2: 100 %  O2 Device: None (Room air)        Physical Exam  Vitals and nursing note reviewed.   Constitutional:       Appearance: She is  well-developed.   HENT:      Head: Normocephalic and atraumatic.      Mouth/Throat:      Mouth: Mucous membranes are moist.   Eyes:      General: No scleral icterus.  Abdominal:      General: Bowel sounds are normal. There is no distension.      Palpations: Abdomen is soft.      Tenderness: There is abdominal tenderness. There is no guarding.      Comments: Mild suprapubic tenderness   Skin:     General: Skin is warm and dry.   Neurological:      General: No focal deficit present.      Mental Status: She is alert and oriented to person, place, and time.      Cranial Nerves: No cranial nerve deficit.      Motor: No weakness.   Psychiatric:         Mood and Affect: Mood normal.         Behavior: Behavior normal.             ED Course     Labs Reviewed   HCG, BETA SUBUNIT (QUANT PREGNANCY TEST) - Abnormal; Notable for the following components:       Result Value    Hcg Quantitative 12,422.0 (*)     All other components within normal limits   URINALYSIS WITH CULTURE REFLEX - Abnormal; Notable for the following components:    Urine Color Red (*)     Clarity Urine Ex.Turbid (*)     RBC Urine >10 (*)     All other components within normal limits   CBC WITH DIFFERENTIAL WITH PLATELET   SPECIFIC GRAVITY, URINE   ABORH (BLOOD TYPE)   RAINBOW DRAW LAVENDER   RAINBOW DRAW LIGHT GREEN   RAINBOW DRAW BLUE   RAINBOW DRAW GOLD   URINE CULTURE, ROUTINE            US PREG 1ST TRIM W/EV (CPT=76801/44174)    Result Date: 12/15/2024  CONCLUSION:  Mild thickening of the endometrium likely due to blood products from recent miscarriage.   LOCATION:  [place]   Dictated by (CST): Antonio Gonzalez MD on 12/15/2024 at 7:41 AM     Finalized by (CST): Antonio Gonzalez MD on 12/15/2024 at 7:45 AM             MDM      24-year-old female  approximately 8 weeks pregnant presents with heavy vaginal bleeding that started at 3 AM.      Differential includes but is not limited to spontaneous miscarriage, retained products, subchorionic hemorrhage    Labs  are unremarkable with normal hemoglobin of 13.8 increased from previous of 1 10.2 on 4/29/2024.  Blood type a positive.  Beta-hCG 12K.    Independent interpretation of pregnancy ultrasound shows no IUP.  Radiology report reviewed as above noting some mild thickening of the endometrium likely due to blood products from recent miscarriage.    Vital signs remain normal throughout ED stay.  Patient states bleeding has slowed down.  Patient went to the bathroom and did not have any further vaginal bleeding.  Appropriate for discharge home.  Advised to follow-up closely with her OB.  Return precautions discussed.    Medical Decision Making  Amount and/or Complexity of Data Reviewed  Labs: ordered. Decision-making details documented in ED Course.  Radiology: ordered and independent interpretation performed. Decision-making details documented in ED Course.    Risk  Prescription drug management.        Disposition and Plan     Clinical Impression:  1. Spontaneous miscarriage (HCC)         Disposition:  Discharge  12/15/2024  8:11 am    Follow-up:  Chayo Shah MD  [address] [place] [id number] [phone]    Schedule an appointment as soon as possible for a visit            Medications Prescribed:  Current Discharge Medication List        START taking these medications    Details   naproxen 500 MG Oral Tab Take 1 tablet (500 mg total) by mouth 2 (two) times daily as needed.  Qty: 20 tablet, Refills: 0                 Supplementary Documentation:

## 2024-12-15 NOTE — ED INITIAL ASSESSMENT (HPI)
Patient dx with miscarriage  at a peg confirmation appointment, she was told there was no HR and no yolk sac and was measuring about 6 weeks preg at that time. , started bleeding with clots today with pelvic pressure.

## 2025-02-02 NOTE — ANESTHESIA PREPROCEDURE EVALUATION
PRE-OP EVALUATION    Patient Name: Génesis Vergara    Admit Diagnosis: RETAINED PRODUCTS    Pre-op Diagnosis: RETAINED PRODUCTS    HYSTEROSCOPY, REMOVAL RETAINED PRODUCTS OF CONCEPTION    Anesthesia Procedure: HYSTEROSCOPY, REMOVAL RETAINED PRODUCTS OF CONCEPTION    Surgeons and Role:     * Jacques Marcelo MD - Primary    Pre-op vitals reviewed.  Temp: 98.4 °F (36.9 °C)  Pulse: 71  Resp: 16  BP: 111/69  SpO2: 99 %  Body mass index is 39.69 kg/m².    Current medications reviewed.  Hospital Medications:   acetaminophen (Tylenol Extra Strength) tab 1,000 mg  1,000 mg Oral Once    scopolamine (Transderm-Scop) 1 MG/3DAYS patch 1 patch  1 patch Transdermal Once    lactated ringers infusion   Intravenous Continuous    doxycycline hyclate (Vibramycin) 200 mg in sodium chloride 0.9% 250 mL IVPB  200 mg Intravenous Once       Outpatient Medications:   Prescriptions Prior to Admission[1]    Allergies: Patient has no known allergies.      Anesthesia Evaluation    Patient summary reviewed.    Anesthetic Complications  (-) history of anesthetic complications         GI/Hepatic/Renal    Negative GI/hepatic/renal ROS.                             Cardiovascular                (+) obesity                                       Endo/Other  Comment: Retained products of conception for hysteroscopy and removal of retained products. Miscarriage 12/18/2024                                Pulmonary    Negative pulmonary ROS.                       Neuro/Psych    Negative neuro/psych ROS.                                  History reviewed. No pertinent surgical history.  Social History     Socioeconomic History    Marital status:    Tobacco Use    Smoking status: Never    Smokeless tobacco: Never   Vaping Use    Vaping status: Never Used   Substance and Sexual Activity    Alcohol use: Not Currently     Comment: soc    Drug use: Never    Sexual activity: Yes     Partners: Male     History   Drug Use Unknown     Available pre-op labs  reviewed.  Lab Results   Component Value Date    WBC 8.1 2025    RBC 4.50 2025    HGB 12.5 2025    HCT 39.1 2025    MCV 86.9 2025    MCH 27.8 2025    MCHC 32.0 2025    RDW 13.2 2025    .0 (H) 2025               Airway      Mallampati: II  Mouth opening: >3 FB  TM distance: > 6 cm  Neck ROM: full Cardiovascular    Cardiovascular exam normal.  Rhythm: regular  Rate: normal  (-) murmur   Dental             Pulmonary    Pulmonary exam normal.  Breath sounds clear to auscultation bilaterally.               Other findings              ASA: 2   Plan: general and MAC  NPO status verified and patient meets guidelines.        Comment: Plan for MAC. A detailed discussion of the risks and benefits of the proposed anesthetic was had in the preoperative area, including risk of conversion to a general anesthetic, nausea/vomiting, dental damage, sore throat and allergic/ adverse reactions to medications administered. The possibility of needing to convert to general anesthesia if necessary was discussed. Questions answered and patient wishes to proceed. Consent signed.     Plan/risks discussed with: patient                Present on Admission:  **None**             [1]   Medications Prior to Admission   Medication Sig Dispense Refill Last Dose/Taking    [] naproxen 500 MG Oral Tab Take 1 tablet (500 mg total) by mouth 2 (two) times daily as needed. 20 tablet 0 Taking As Needed

## 2025-02-03 NOTE — ADDENDUM NOTE
Addendum  created 02/03/25 1348 by Sarah Peres CRNA    Intraprocedure Meds edited, Orders acknowledged in Narrator

## 2025-02-03 NOTE — H&P
HPI:  Pt with continued light vaginal bleeding after miscarriage. Recent ultrasound showed poss retained products of conception. Here for hystereoscopy  SIGNIFICANT HISTORY:  HISTORY:  Past Medical History:    Anesthesia complication    Per patient, BP dropped previously in May 2024 with epidural    Disorder of thyroid    Hypothyroidism    no current meds    Obesity, Class II, BMI 35-39.9    Thyroid disease    Visual impairment    Glasses    Vitamin D deficiency      History reviewed. No pertinent surgical history.   Family History   Problem Relation Age of Onset    Hypertension Father     Lipids Mother       Social History     Socioeconomic History    Marital status:    Tobacco Use    Smoking status: Never    Smokeless tobacco: Never   Vaping Use    Vaping status: Never Used   Substance and Sexual Activity    Alcohol use: Not Currently     Comment: soc    Drug use: Never    Sexual activity: Yes     Partners: Male     Social Drivers of Health     Transportation Needs: Unknown (2024)    Transportation Needs     Car Seat: Yes    [facility]    Housing Stability        History reviewed. No pertinent surgical history.  OB History    Para Term  AB Living   2 2 2 0 0 2   SAB IAB Ectopic Multiple Live Births   0 0 0 0 2     Social History     Socioeconomic History    Marital status:    Tobacco Use    Smoking status: Never    Smokeless tobacco: Never   Vaping Use    Vaping status: Never Used   Substance and Sexual Activity    Alcohol use: Not Currently     Comment: soc    Drug use: Never    Sexual activity: Yes     Partners: Male     Social Drivers of Health     Transportation Needs: Unknown (2024)    Transportation Needs     Car Seat: Yes    [facility]    Housing Stability         ROS:  GENERAL HEALTH: feels well otherwise  GI: denies nausea, vomiting,  constipation, diarrhea; no rectal bleeding; no heartburn  GYNE/: no dysuria, urgency or frequency; no hx abnormal pap smear; no vaginal discharge; no dyspareunia; no urinary incontinence  MUSCULOSKELETAL: no joint complaints upper or lower extremities  PSYCHE: no symptoms of depression or anxiety  HEMATOLOGY: denies hx anemia; denies bruising or excessive bleeding  ENDOCRINE:  denies unexpected wt gain or wt loss  ALLERGY/IMM.: denies food or seasonal allergies    PHYSICAL EXAM:  GENERAL: well developed, well nourished, in no apparent distress  SKIN: no rashes, no suspicious lesions  HEENT: normal  LUNGS: clear to auscultation  CARDIOVASCULAR: normal S1, S2, RRR  ABDOMEN: Soft, non distended; non tender, no masses  GYNE/: External Genitalia: Normal                      Vagina: normal                      Uterus: av, mobile, non tender, small                     Cervix: pink and clear, no lesions                     Adnexa: non tender, no masses  EXTREMITIES:  non tender without edema    Imaging:  UTERUS:    The uterus is anteverted and measures 9.1 x 6.2 x 4.5 cm. Total volume is 131.5 mL.   The myometrium is homogeneous.   The endometrial echo complex is heterogeneous and measures up to 9.0 mm.     RIGHT OVARY:   The right ovary measures 2.7 x 2.1 x 1.8 cm. Total volume is 5.3 mL.        LEFT OVARY:   The left ovary measures 2.5 x 2.0 x 2.3 cm. Total volume is 6.1 mL.   Simple left paraovarian cyst measures 0.8 x 0.7 x 0.9 cm.     There is no significant free fluid.       =====   IMPRESSION:   * Slightly heterogeneous endometrium without increased vascularity.   *  Simple left paraovarian cyst, O-RADS 2.   .     ASSESMENT/PLAN:  1. Surgery to be performed:hysteroscopy with resection of retained products of conception  2. Indication:retained products  3. The procedure, its risks, benefits, possible complications and alternatives discussed with the patient.  She understands and agrees to the  procedure.      Id#[id number]

## 2025-02-03 NOTE — OPERATIVE REPORT
Génesis Vergara Patient Status:  Hospital Outpatient Surgery    2000 MRN [id number]   [facility] SURGERY Attending Jacques Marcelo MD   Hosp Day # 0 [facility] ROXI SQUIRES         OPERATIVE REPORT      PREOPERATIVE DIAGNOSIS:  metrorrhagia, poss retained products of conception  POSTOPERATIVE DIAGNOSIS:  same  PROCEDURE PERFORMED:  hysteroscopy with resection of thickened endometrium. Poss retained products  SURGEON: RUCHI Marcelo M.D.  ANESTHESIA:  mac  EBL-5cc  FINDINGS:  thickened patch of endometrium anterior and central endometrial cavity      TECHNIQUE:  The patient was prepped and draped in the sterile fashion after satisfactory anesthesia was given.  The patient was examined and the bladder was catheterized.  A speculum was placed in the vagina and the anterior lip of the cervix was grasped with a ring forceps.   The truclear hysteroscope was placed through the cervix into the uterine cavity. Bilat tubal ostia were visualized. The soft tissue mini shaver was placed through the operative channel and the entire endometrium was sampled including area of poss retained products  and the uterus was free of pathology. The tissue was sent to pathology. The hysteroscope and ring forceps were removed from the vagina. The cervix was hemostatic. The speculum was removed and the patient awoken from anesthesia and transferred to recovery room in good condition.    Jacques Marcelo MD  25

## 2025-02-03 NOTE — DISCHARGE INSTRUCTIONS
Hysteroscopy and Dilatation and Curettage  Endometrial Ablation    Hardy Abdul, Fozia, Gary Pacheco Sayla, Schreiber, Shawn, Angela, Weeks    After surgery you may have some light vaginal bleeding. This may last up to a week and is not a concern unless it is heavier than a menstrual period. If you had an endometrial ablation, you can expect a watery discharge which may last for up to 4-6 weeks.    You should avoid tampons for the first week after surgery. Also no intercourse for one week after a D and C and two weeks after an ablation.    You may experience cramping the day/evening of surgery. This is usually relieved with over the counter Acetaminophen (Tylenol), Ibuprofen (Motrin or Advil) or Naprosyn (Aleve). A prescription pain medicine may be ordered by your physician. You may also be given a medication for possible nausea.    You should rest the day of surgery. No driving for 24 hours after general anesthesia or sedation or if you are taking prescription pain medications. You may resume normal activities the next day. Showering is fine the day after surgery. Exercise is fine the next day if you are comfortable doing it.    You may resume your normal diet once your appetite returns after surgery.  Some patients will experience nausea from anesthesia or narcotics: we recommend you start with a light diet. Do not drink alcohol or use other sedating medications (sleep aids, sedating cold medications) if taking prescription pain medications. Resume your normal medications as instructed.    Please call our office if you experience any of the following symptoms:  Temperature over 100.5 degrees  Vaginal bleeding heavier than a moderate period  Severe abdominal/pelvic pain  Shortness of breath or chest pain  New onset of leg pain and/or swelling    If a specimen was sent to pathology, you can expect a call from your doctor within 10 days with the report.   If your doctor requested a post op  appointment, please call to schedule an appointment for 2 weeks post op.    Please call with any other questions or concerns.    Office Number: [phone]        *You have been given a drug called Toradol today at- 1:00 PM    *This is an Anti-Inflammatory drug in IV form.     *Do not take any additional anti-inflammatory drugs (Ibuprofen, Motrin, Aleve, Naprosyn) ....until after___7:00 PM_____ to prevent overdose.

## 2025-02-03 NOTE — PACU NOTE
Patient spoke with surgeon after procedures and decided not to send remains to the  home.  Almita in pathology notified.

## 2025-02-03 NOTE — ANESTHESIA POSTPROCEDURE EVALUATION
[facility]    Génesis Vergara Patient Status:  Hospital Outpatient Surgery   Age/Gender 25 year old female MRN [id number]   Location [facility] SURGERY Attending Jacques Marcelo MD   Hosp Day # 0 PCP ROXI SQUIRES       Anesthesia Post-op Note    HYSTEROSCOPY, REMOVAL RETAINED PRODUCTS OF CONCEPTION    Procedure Summary       Date: 02/03/25 Room / Location:  MAIN OR 11 / [facility] MAIN OR    Anesthesia Start: 1224 Anesthesia Stop: 1315    Procedure: HYSTEROSCOPY, REMOVAL RETAINED PRODUCTS OF CONCEPTION (Vagina ) Diagnosis: (RETAINED PRODUCTS)    Surgeons: Jacques Marcelo MD Anesthesiologist: Ryan Flowers MD    Anesthesia Type: general, MAC ASA Status: 2            Anesthesia Type: general, MAC    Vitals Value Taken Time   /59 02/03/25 1315   Temp 97.2 02/03/25 1315   Pulse 81 02/03/25 1315   Resp 12 02/03/25 1315   SpO2 99% 02/03/25 1315           Patient Location: Same Day Surgery    Anesthesia Type: MAC    Airway Patency: patent    Postop Pain Control: adequate    Mental Status: preanesthetic baseline    Nausea/Vomiting: none    Cardiopulmonary/Hydration status: stable euvolemic    Complications: no apparent anesthesia related complications    Postop vital signs: stable    Dental Exam: Unchanged from Preop    Patient to be discharged from PACU when criteria met.